# Patient Record
Sex: FEMALE | Race: WHITE | Employment: UNEMPLOYED | URBAN - METROPOLITAN AREA
[De-identification: names, ages, dates, MRNs, and addresses within clinical notes are randomized per-mention and may not be internally consistent; named-entity substitution may affect disease eponyms.]

---

## 2019-04-26 LAB — CREATININE, EXTERNAL: 0.6

## 2019-08-14 LAB — LDL-C, EXTERNAL: 107

## 2019-12-14 ENCOUNTER — HOSPITAL ENCOUNTER (EMERGENCY)
Age: 29
Discharge: HOME | End: 2019-12-14
Attending: EMERGENCY MEDICINE
Payer: OTHER GOVERNMENT

## 2019-12-14 ENCOUNTER — APPOINTMENT (OUTPATIENT)
Dept: GENERAL RADIOLOGY | Age: 29
End: 2019-12-14
Attending: EMERGENCY MEDICINE
Payer: OTHER GOVERNMENT

## 2019-12-14 VITALS
SYSTOLIC BLOOD PRESSURE: 117 MMHG | HEART RATE: 84 BPM | DIASTOLIC BLOOD PRESSURE: 74 MMHG | RESPIRATION RATE: 16 BRPM | OXYGEN SATURATION: 99 % | BODY MASS INDEX: 20.4 KG/M2 | TEMPERATURE: 98.7 F | HEIGHT: 67 IN | WEIGHT: 130 LBS

## 2019-12-14 DIAGNOSIS — S16.1XXA STRAIN OF NECK MUSCLE, INITIAL ENCOUNTER: Primary | ICD-10-CM

## 2019-12-14 PROCEDURE — 74011250637 HC RX REV CODE- 250/637: Performed by: EMERGENCY MEDICINE

## 2019-12-14 PROCEDURE — 99283 EMERGENCY DEPT VISIT LOW MDM: CPT

## 2019-12-14 PROCEDURE — 72040 X-RAY EXAM NECK SPINE 2-3 VW: CPT

## 2019-12-14 RX ORDER — IBUPROFEN 600 MG/1
600 TABLET ORAL
Qty: 20 TAB | Refills: 0 | Status: SHIPPED | OUTPATIENT
Start: 2019-12-14

## 2019-12-14 RX ORDER — IBUPROFEN 600 MG/1
600 TABLET ORAL
Status: COMPLETED | OUTPATIENT
Start: 2019-12-14 | End: 2019-12-14

## 2019-12-14 RX ADMIN — IBUPROFEN 600 MG: 600 TABLET, FILM COATED ORAL at 02:00

## 2019-12-14 NOTE — DISCHARGE INSTRUCTIONS
Patient Education        Neck Strain: Care Instructions  Your Care Instructions    You have strained the muscles and ligaments in your neck. A sudden, awkward movement can strain the neck. This often occurs with falls or car accidents or during certain sports. Everyday activities like working on a computer or sleeping can also cause neck strain if they force you to hold your neck in an awkward position for a long time. It is common for neck pain to get worse for a day or two after an injury, but it should start to feel better after that. You may have more pain and stiffness for several days before it gets better. This is expected. It may take a few weeks or longer for it to heal completely. Good home treatment can help you get better faster and avoid future neck problems. Follow-up care is a key part of your treatment and safety. Be sure to make and go to all appointments, and call your doctor if you are having problems. It's also a good idea to know your test results and keep a list of the medicines you take. How can you care for yourself at home? · If you were given a neck brace (cervical collar) to limit neck motion, wear it as instructed for as many days as your doctor tells you to. Do not wear it longer than you were told to. Wearing a brace for too long can make neck stiffness worse and weaken the neck muscles. · You can try using heat or ice to see if it helps. ? Try using a heating pad on a low or medium setting for 15 to 20 minutes every 2 to 3 hours. Try a warm shower in place of one session with the heating pad. You can also buy single-use heat wraps that last up to 8 hours. ? You can also try an ice pack for 10 to 15 minutes every 2 to 3 hours. · Take pain medicines exactly as directed. ? If the doctor gave you a prescription medicine for pain, take it as prescribed. ? If you are not taking a prescription pain medicine, ask your doctor if you can take an over-the-counter medicine.   · Gently rub the area to relieve pain and help with blood flow. Do not massage the area if it hurts to do so. · Do not do anything that makes the pain worse. Take it easy for a couple of days. You can do your usual activities if they do not hurt your neck or put it at risk for more stress or injury. · Try sleeping on a special neck pillow. Place it under your neck, not under your head. Placing a tightly rolled-up towel under your neck while you sleep will also work. If you use a neck pillow or rolled towel, do not use your regular pillow at the same time. · To prevent future neck pain, do exercises to stretch and strengthen your neck and back. Learn how to use good posture, safe lifting techniques, and proper body mechanics. When should you call for help? Call 911 anytime you think you may need emergency care. For example, call if:    · You are unable to move an arm or a leg at all.   [facility] your doctor now or seek immediate medical care if:    · You have new or worse symptoms in your arms, legs, chest, belly, or buttocks. Symptoms may include:  ? Numbness or tingling. ? Weakness. ? Pain.     · You lose bladder or bowel control.    Watch closely for changes in your health, and be sure to contact your doctor if:    · You are not getting better as expected. Where can you learn more? Go to http://richar-janie.info/. Enter M253 in the search box to learn more about \"Neck Strain: Care Instructions. \"  Current as of: June 26, 2019  Content Version: 12.2  © 0945-5548 [employer]. Care instructions adapted under license by [employer] (which disclaims liability or warranty for this information). If you have questions about a medical condition or this instruction, always ask your healthcare professional. [address] any warranty or liability for your use of this information.

## 2019-12-14 NOTE — ED PROVIDER NOTES
HPI patient is a 59-year-old female who complains of neck pain for 5 days. Pt c/o pain from top of head down to neck d/t jerking injury several days ago. History reviewed. No pertinent past medical history. History reviewed. No pertinent surgical history. History reviewed. No pertinent family history. Social History     Socioeconomic History    Marital status:      Spouse name: Not on file    Number of children: Not on file    Years of education: Not on file    Highest education level: Not on file   Occupational History    Not on file   Social Needs    Financial resource strain: Not on file    Food insecurity:     Worry: Not on file     Inability: Not on file    Transportation needs:     Medical: Not on file     Non-medical: Not on file   Tobacco Use    Smoking status: Current Every Day Smoker     Packs/day: 1.00    Smokeless tobacco: Never Used   Substance and Sexual Activity    Alcohol use: Not Currently    Drug use: Never    Sexual activity: Not on file   Lifestyle    Physical activity:     Days per week: Not on file     Minutes per session: Not on file    Stress: Not on file   Relationships    Social connections:     Talks on phone: Not on file     Gets together: Not on file     Attends Rastafarian service: Not on file     Active member of club or organization: Not on file     Attends meetings of clubs or organizations: Not on file     Relationship status: Not on file    Intimate partner violence:     Fear of current or ex partner: Not on file     Emotionally abused: Not on file     Physically abused: Not on file     Forced sexual activity: Not on file   Other Topics Concern    Not on file   Social History Narrative    Not on file         ALLERGIES: Patient has no known allergies. Review of Systems   Constitutional: Negative. HENT: Negative. Eyes: Negative. Respiratory: Negative. Cardiovascular: Negative. Gastrointestinal: Negative.     Endocrine: Negative. Genitourinary: Negative. Musculoskeletal: Negative. Skin: Negative. Allergic/Immunologic: Negative. Neurological: Negative. Hematological: Negative. Psychiatric/Behavioral: Negative. All other systems reviewed and are negative. Vitals:    12/14/19 0009   BP: 117/74   Pulse: 84   Resp: 16   Temp: 98.7 °F (37.1 °C)   SpO2: 99%   Weight: 59 kg (130 lb)   Height: 5' 7\" (1.702 m)            Physical Exam  Vitals signs and nursing note reviewed. Constitutional:       General: She is not in acute distress. Appearance: She is well-developed. She is not diaphoretic. HENT:      Head: Normocephalic. Right Ear: External ear normal.      Left Ear: External ear normal.      Mouth/Throat:      Pharynx: No oropharyngeal exudate. Eyes:      General: No scleral icterus. Right eye: No discharge. Left eye: No discharge. Conjunctiva/sclera: Conjunctivae normal.      Pupils: Pupils are equal, round, and reactive to light. Neck:      Musculoskeletal: Normal range of motion and neck supple. Muscular tenderness (mild neck tenderness) present. Thyroid: No thyromegaly. Vascular: No JVD. Trachea: No tracheal deviation. Cardiovascular:      Rate and Rhythm: Normal rate and regular rhythm. Heart sounds: Normal heart sounds. No murmur. No friction rub. No gallop. Pulmonary:      Effort: Pulmonary effort is normal. No respiratory distress. Breath sounds: Normal breath sounds. No stridor. No wheezing or rales. Chest:      Chest wall: No tenderness. Abdominal:      General: Bowel sounds are normal. There is no distension. Palpations: Abdomen is soft. There is no mass. Tenderness: There is no tenderness. There is no guarding or rebound. Musculoskeletal: Normal range of motion. General: No tenderness. Lymphadenopathy:      Cervical: No cervical adenopathy. Skin:     General: Skin is warm and dry.       Coloration: Skin is not pale. Findings: No erythema or rash. Neurological:      Mental Status: She is alert and oriented to person, place, and time. Cranial Nerves: No cranial nerve deficit. Motor: No abnormal muscle tone. Coordination: Coordination normal.      Deep Tendon Reflexes: Reflexes normal.          MDM  Number of Diagnoses or Management Options  Strain of neck muscle, initial encounter:        Dx: Disp: D/C  Home    Dictation disclaimer:  Please note that this dictation was completed with [employer], the computer voice recognition software. Quite often unanticipated grammatical, syntax, homophones, and other interpretive errors are inadvertently transcribed by the computer software. Please disregard these errors. Please excuse any errors that have escaped final proofreading.

## 2019-12-14 NOTE — ED NOTES
2:02 AM  12/14/19     Discharge instructions given to Ewa Abreu (name) with verbalization of understanding. Patient accompanied by self. Patient discharged with the following prescriptions motrin. Patient discharged to home (destination).       Wendy Aquino

## 2020-08-19 NOTE — PROGRESS NOTES
[facility]  [address], [address]  Phone: [phone]  Fax: [phone]        INITIAL CONSULTATION      HISTORY OF PRESENT ILLNESS:  Deng Salas is a 26 y.o. female whom is referred from Logan Alvarenga NP secondary to upper cervical spine pain radiating into the RUE to the elbow x 8 months following a fall. She rates her pain 2/10. Pt was holding a ladder and fell backwards. She reports her pain has improved since onset. She has treated with steroids, NSAIDs and muscle relaxants without benefit. She reports intolerance to NEURONTIN secondary to drowsiness. Pt denies dropping things or loss of balance. Pt completed PT with benefit. She is inconsistent with her HEP. Patient denies previous spinal surgery or injections. ER note from Dr. Angel Cesar dated 12/14/2019 indicating patient presented for neck pain from the top of head down to neck . Note from Logan Alvarenga NP dated 6/19/2020 indicating patient was seen with c/o neck pain radiating into her bilateral shoulders after a fall x 7 months ago which caused hyperflexion of her neck. Treated with PT and NSAIDs. Note from Logan Alvarenga NP dated 7/6/2020 indicating patient went to ER at [facility] for chest pain under the right breast. Pt had c/o right sided rib pain. She treated with Naprosyn with benefit. C spine XR dated 12/14/2019 films not independently reviewed. Per report, reversed cervical lordosis with trace anterolisthesis C3 upon 4. No vertebral body compression deformity. Moderate presumed degenerative disc space narrowing C4-5. C spine MRI dated 7/6/2020 films not available for my independent review. Per report, mild multilevel cervical spondylosis without significant spinal canal stenosis. Disc osteophyte complex and proliferative changes at the C4-5 level noted with moderate left-sided neural foraminal narrowing. No area of high-grade neural foraminal stenosis. The patient is RHD.  reviewed. Body mass index is 19.33 kg/m². PCP: UNKNOWN    History reviewed. No pertinent past medical history. History reviewed. No pertinent surgical history. Social History     Tobacco Use    Smoking status: Current Every Day Smoker     Packs/day: 1.00    Smokeless tobacco: Never Used   Substance Use Topics    Alcohol use: Not Currently       Work status: The patient is unemployed. Marital status: . Current Outpatient Medications   Medication Sig Dispense Refill    ibuprofen (MOTRIN) 600 mg tablet Take 1 Tab by mouth every six (6) hours as needed for Pain. 20 Tab 0       No Known Allergies         Family History   Problem Relation Age of Onset    Cancer Mother     Cancer Father          REVIEW OF SYSTEMS  Constitutional symptoms: Negative  Eyes: Negative  Ears, Nose, Throat, and Mouth: Negative  Cardiovascular: Negative  Respiratory: Negative  Genitourinary: Negative  Integumentary (Skin and/or breast): Negative  Musculoskeletal: Positive for neck pain. Extremities: Negative for edema. Endocrine/Rheumatologic: Negative  Hematologic/Lymphatic: Negative  Allergic/Immunologic: Negative  Psychiatric: Negative       PHYSICAL EXAMINATION  Visit Vitals  /65 (BP 1 Location: Left arm, BP Patient Position: Sitting)   Pulse 76   Temp 97.8 °F (36.6 °C) (Temporal)   Ht 5' 7\" (1.702 m)   Wt 123 lb 6.4 oz (56 kg)   SpO2 98%   BMI 19.33 kg/m²       CONSTITUTIONAL: NAD, A&O x 3  HEART: Regular rate and rhythm  GASTROINTESTINAL: Positive bowel sounds, soft, nontender, and nondistended  LUNGS: Clear to auscultation bilaterally. SKIN: Negative for rash. RANGE OF MOTION: The patient has full passive range of motion in all four extremities. SENSATION: Sensation is intact to light touch throughout. MOTOR:   Straight Leg Raise: Negative, bilateral  [place]: Negative, bilateral  Tandem Gait: Neg.    Deep tendon reflexes are 0 at the biceps, triceps, and brachioradialis bilaterally. Deep tendon reflexes are 2 at the knees and ankles bilaterally. Shoulder AB/Flex Elbow Flex Wrist Ext Elbow Ext Wrist Flex Hand Intrin Tone   Right +4/5 +4/5 +4/5 +4/5 +4/5 +4/5 +4/5   Left +4/5 +4/5 +4/5 +4/5 +4/5 +4/5 +4/5              Hip Flex Knee Ext Knee Flex Ankle DF GTE Ankle PF Tone   Right +4/5 +4/5 +4/5 +4/5 +4/5 +4/5 +4/5   Left +4/5 +4/5 +4/5 +4/5 +4/5 +4/5 +4/5       ASSESSMENT   Diagnoses and all orders for this visit:    1. HNP (herniated nucleus pulposus) with myelopathy, cervical    2. Cervical spondylosis without myelopathy    3. Cervical neuritis       IMPRESSIONS/RECOMMENDATIONS:  Patient presents today with c/o neck pain. Multiple treatment options were discussed. I discussed with the pt a referral to a chiropractor. However, I told her I would like the opportunity to review her MRI films prior to the order to ensure there are no contraindications. There are no contraindications from her MRI report. She was agreeable to bring her films to my office for my review. I recommended she increase the frequency of HEP to daily. Patient is neurologically intact. I will see the patient back in 6 week's time or earlier if needed. Written by Shikha Maharaj, as dictated by Dempsey Sever, MD  I examined the patient, reviewed and agree with the note.

## 2020-08-20 ENCOUNTER — OFFICE VISIT (OUTPATIENT)
Dept: ORTHOPEDIC SURGERY | Age: 30
End: 2020-08-20

## 2020-08-20 VITALS
BODY MASS INDEX: 19.37 KG/M2 | HEART RATE: 76 BPM | HEIGHT: 67 IN | WEIGHT: 123.4 LBS | OXYGEN SATURATION: 98 % | TEMPERATURE: 97.8 F | SYSTOLIC BLOOD PRESSURE: 107 MMHG | DIASTOLIC BLOOD PRESSURE: 65 MMHG

## 2020-08-20 DIAGNOSIS — M47.812 CERVICAL SPONDYLOSIS WITHOUT MYELOPATHY: ICD-10-CM

## 2020-08-20 DIAGNOSIS — M54.12 CERVICAL NEURITIS: ICD-10-CM

## 2020-08-20 DIAGNOSIS — M50.00 HNP (HERNIATED NUCLEUS PULPOSUS) WITH MYELOPATHY, CERVICAL: Primary | ICD-10-CM

## 2020-08-28 ENCOUNTER — TELEPHONE (OUTPATIENT)
Dept: ORTHOPEDIC SURGERY | Age: 30
End: 2020-08-28

## 2020-08-28 DIAGNOSIS — M54.12 CERVICAL NEURITIS: Primary | ICD-10-CM

## 2020-08-28 DIAGNOSIS — M47.812 CERVICAL SPONDYLOSIS WITHOUT MYELOPATHY: ICD-10-CM

## 2020-08-28 DIAGNOSIS — M50.00 HNP (HERNIATED NUCLEUS PULPOSUS) WITH MYELOPATHY, CERVICAL: ICD-10-CM

## 2020-08-28 NOTE — TELEPHONE ENCOUNTER
Patient called for Cody Funes. Patient would like to know if Cody Funes had a chance to look at the Mri she dropped off at the [facility] location this past Monday 8/24/20 or Tuesday 8/25/20. Patient would like to know what Cody Funes next options will be for her Neck. Patient tel. [phone]. Note : patient was seen on 8/20/20 for her neck and back by Cody Funes.

## 2020-09-01 NOTE — TELEPHONE ENCOUNTER
Patient is aware that we do have her disk and I will have Dr. Jeanmarie Lancaster review it tomorrow.

## 2020-09-09 NOTE — TELEPHONE ENCOUNTER
Spoke with patient on 9.4.2020 and informed her that per Dr. Guyann Ahumada she is ok to continue with a chiropractor. Patient would like to know which one Dr. Guyann Ahumada would perfer she go to. I will discuss this with his today.

## 2020-09-25 ENCOUNTER — TELEPHONE (OUTPATIENT)
Dept: ORTHOPEDIC SURGERY | Age: 30
End: 2020-09-25

## 2020-09-25 DIAGNOSIS — M50.00 HNP (HERNIATED NUCLEUS PULPOSUS) WITH MYELOPATHY, CERVICAL: ICD-10-CM

## 2020-09-25 DIAGNOSIS — M54.12 CERVICAL NEURITIS: Primary | ICD-10-CM

## 2020-09-25 DIAGNOSIS — M47.812 CERVICAL SPONDYLOSIS WITHOUT MYELOPATHY: ICD-10-CM

## 2020-09-25 NOTE — TELEPHONE ENCOUNTER
Contacted patient, reached unidentified voice, left generic message. Attempted to contact patient in regards to the Chiropractic referral. This is a non-covered service according to [employer] and patient would have to pay out of pocket. I can still forward the referral or we could order physical therapy which is a covered service. Which would Ms. Kelvin Fung prefer?

## 2020-09-29 NOTE — TELEPHONE ENCOUNTER
Patient called in twice. I returned her call but connected with unidentified voice mail and left another generic message.

## 2020-09-29 NOTE — TELEPHONE ENCOUNTER
Mr. Danny Aguilera wasn't aware her  [employer] did not cover chiropractics. After some discussion, she would like to pursue physical therapy instead. Please enter a physical therapy order so I may obtain authorization for her. Thank you.

## 2020-10-01 NOTE — TELEPHONE ENCOUNTER
[facility] authorization has been obtained and forwarded for scheduling. See referral for facility information. Thank you.

## 2020-11-19 VITALS — BODY MASS INDEX: 19.77 KG/M2 | HEIGHT: 68 IN

## 2020-11-19 PROBLEM — M54.12 CERVICAL RADICULOPATHY: Status: ACTIVE | Noted: 2020-11-19

## 2021-02-08 NOTE — PROGRESS NOTES
[facility]  [address], [address]  Phone: [phone]  Fax: [phone]        PROGRESS NOTE      HISTORY OF PRESENT ILLNESS:  The patient is a 26 y.o. female and was seen today for follow up of upper cervical spine pain radiating into the RUE to the elbow x 1/2020 following a fall. Pt was holding a ladder and fell backwards. She reports her pain has improved since onset. She has treated with steroids, NSAIDs and muscle relaxants without benefit. She reports intolerance to NEURONTIN secondary to drowsiness. Pt denies dropping things or loss of balance. Pt completed PT with benefit. She is inconsistent with her HEP. Patient denies previous spinal surgery or injections. The patient is RHD. ER note from Dr. Asim Vaca dated 12/14/2019 indicating patient presented for neck pain from the top of head down to neck . Note from Cecilia Garcia NP dated 6/19/2020 indicating patient was seen with c/o neck pain radiating into her bilateral shoulders after a fall x 7 months ago which caused hyperflexion of her neck. Treated with PT and NSAIDs. Note from Cecilia Garcia NP dated 7/6/2020 indicating patient went to ER at [facility] for chest pain under the right breast. Pt had c/o right sided rib pain. She treated with Naprosyn with benefit. C spine XR dated 12/14/2019 films not independently reviewed. Per report, reversed cervical lordosis with trace anterolisthesis C3 upon 4. No vertebral body compression deformity. Moderate presumed degenerative disc space narrowing C4-5. C spine MRI dated 7/6/2020 films not available for my independent review. Per report, mild multilevel cervical spondylosis without significant spinal canal stenosis. Disc osteophyte complex and proliferative changes at the C4-5 level noted with moderate left-sided neural foraminal narrowing. No area of high-grade neural foraminal stenosis.  At her last clinical appointment, I discussed with the pt a referral to a chiropractor. However, I told her I would like the opportunity to review her MRI films prior to the order to ensure there were no contraindications. There were no contraindications from her MRI report. She was agreeable to bring her films to my office for my review. I recommended she increase the frequency of HEP to daily. The patient returns today with neck pain radiating into the LUE to the hand involving digits 4 and 5. She rates her pain 3-10/10, previously 2/10. She reports occasional radicular pain from her cervical spine extending into the buttocks. Pt failed to f/u in 6 week's time due to improvement in her pain. She had an increase in pain 2 months ago without trauma. She is compliant with her HEP. Pt denies dropping things or loss of balance.  reviewed. Body mass index is 19.73 kg/m².     PCP: UNKNOWN      Past Medical History:   Diagnosis Date    Dizziness     Ear problems     Pneumonia         Social History     Socioeconomic History    Marital status:      Spouse name: Not on file    Number of children: Not on file    Years of education: Not on file    Highest education level: Not on file   Occupational History    Not on file   Social Needs    Financial resource strain: Not on file    Food insecurity     Worry: Not on file     Inability: Not on file    Transportation needs     Medical: Not on file     Non-medical: Not on file   Tobacco Use    Smoking status: Current Every Day Smoker     Packs/day: 1.00    Smokeless tobacco: Never Used   Substance and Sexual Activity    Alcohol use: Not Currently    Drug use: Never    Sexual activity: Not on file   Lifestyle    Physical activity     Days per week: Not on file     Minutes per session: Not on file    Stress: Not on file   Relationships    Social connections     Talks on phone: Not on file     Gets together: Not on file     Attends Congregational service: Not on file     Active member of club or organization: Not on file     Attends meetings of clubs or organizations: Not on file     Relationship status: Not on file    Intimate partner violence     Fear of current or ex partner: Not on file     Emotionally abused: Not on file     Physically abused: Not on file     Forced sexual activity: Not on file   Other Topics Concern    Not on file   Social History Narrative    Not on file       Current Outpatient Medications   Medication Sig Dispense Refill    methylPREDNISolone (MEDROL DOSEPACK) 4 mg tablet Per dose pack instructions 1 Dose Pack 0    naproxen (NAPROSYN) 500 mg tablet Take 1 Tab by mouth two (2) times daily (with meals). 45 Tab 0    cyclobenzaprine (FLEXERIL) 10 mg tablet Take 1 Tab by mouth three (3) times daily as needed for Muscle Spasm(s). 40 Tab 0    ibuprofen (MOTRIN) 600 mg tablet Take 1 Tab by mouth every six (6) hours as needed for Pain. 20 Tab 0       No Known Allergies       PHYSICAL EXAMINATION    Visit Vitals  /83 (BP 1 Location: Left upper arm)   Pulse 90   Temp 97 °F (36.1 °C)   Resp 18   Ht 5' 7\" (1.702 m)   Wt 126 lb (57.2 kg)   SpO2 100%   BMI 19.73 kg/m²       CONSTITUTIONAL: NAD, A&O x 3  SENSATION: Intact to light touch throughout  NEURO: Brijesh's is negative bilaterally. RANGE OF MOTION: The patient has full passive range of motion in all four extremities. MOTOR:  Straight Leg Raise: Negative, bilateral       Shoulder AB/Flex Elbow Flex Wrist Ext Elbow Ext Wrist Flex Hand Intrin Tone   Right +4/5 +4/5 +4/5 +4/5 +4/5 +4/5 +4/5   Left +4/5 +4/5 +4/5 +4/5 +4/5 +4/5 +4/5              Hip Flex Knee Ext Knee Flex Ankle DF GTE Ankle PF Tone   Right +4/5 +4/5 +4/5 +4/5 +4/5 +4/5 +4/5   Left +4/5 +4/5 +4/5 +4/5 +4/5 +4/5 +4/5     RADIOGRAPHS  Cervical spine plain films dated 2/10/2021. 2 views AP and Lateral. Revealed:  Nonspecific cervical spine straightening. Mild disc space narrowing at C4-5.      Lumbar spine plain films dated 2/10/2021. 2 views AP and Lateral. Revealed:  Mild dextrorotatory scoliosis. [place] L3 convex to the left. Mild disc space narrowing at L3-4, L4-5, and L5-S1. Slight retrolisthesis of L2 on L3. No acute pathology identified. ASSESSMENT   Diagnoses and all orders for this visit:    1. Neck pain  -     AMB POC XRAY, SPINE, CERVICAL; 2 OR 3  -     methylPREDNISolone (MEDROL DOSEPACK) 4 mg tablet; Per dose pack instructions  -     naproxen (NAPROSYN) 500 mg tablet; Take 1 Tab by mouth two (2) times daily (with meals). -     cyclobenzaprine (FLEXERIL) 10 mg tablet; Take 1 Tab by mouth three (3) times daily as needed for Muscle Spasm(s). 2. Lumbar pain  -     AMB POC XRAY, SPINE, LUMBOSACRAL; 2 O  -     methylPREDNISolone (MEDROL DOSEPACK) 4 mg tablet; Per dose pack instructions  -     naproxen (NAPROSYN) 500 mg tablet; Take 1 Tab by mouth two (2) times daily (with meals). -     cyclobenzaprine (FLEXERIL) 10 mg tablet; Take 1 Tab by mouth three (3) times daily as needed for Muscle Spasm(s). 3. Cervical neuritis  -     methylPREDNISolone (MEDROL DOSEPACK) 4 mg tablet; Per dose pack instructions  -     naproxen (NAPROSYN) 500 mg tablet; Take 1 Tab by mouth two (2) times daily (with meals). -     cyclobenzaprine (FLEXERIL) 10 mg tablet; Take 1 Tab by mouth three (3) times daily as needed for Muscle Spasm(s). 4. Cervical spondylosis without myelopathy  -     methylPREDNISolone (MEDROL DOSEPACK) 4 mg tablet; Per dose pack instructions  -     naproxen (NAPROSYN) 500 mg tablet; Take 1 Tab by mouth two (2) times daily (with meals). -     cyclobenzaprine (FLEXERIL) 10 mg tablet; Take 1 Tab by mouth three (3) times daily as needed for Muscle Spasm(s). 5. HNP (herniated nucleus pulposus) with myelopathy, cervical  -     methylPREDNISolone (MEDROL DOSEPACK) 4 mg tablet; Per dose pack instructions  -     naproxen (NAPROSYN) 500 mg tablet; Take 1 Tab by mouth two (2) times daily (with meals). -     cyclobenzaprine (FLEXERIL) 10 mg tablet;  Take 1 Tab by mouth three (3) times daily as needed for Muscle Spasm(s). 6. Strain of neck muscle, initial encounter  -     methylPREDNISolone (MEDROL DOSEPACK) 4 mg tablet; Per dose pack instructions  -     naproxen (NAPROSYN) 500 mg tablet; Take 1 Tab by mouth two (2) times daily (with meals). -     cyclobenzaprine (FLEXERIL) 10 mg tablet; Take 1 Tab by mouth three (3) times daily as needed for Muscle Spasm(s). 7. Thoracic myofascial strain, initial encounter  -     methylPREDNISolone (MEDROL DOSEPACK) 4 mg tablet; Per dose pack instructions  -     naproxen (NAPROSYN) 500 mg tablet; Take 1 Tab by mouth two (2) times daily (with meals). -     cyclobenzaprine (FLEXERIL) 10 mg tablet; Take 1 Tab by mouth three (3) times daily as needed for Muscle Spasm(s). 8. Strain of lumbar region, initial encounter  -     methylPREDNISolone (MEDROL DOSEPACK) 4 mg tablet; Per dose pack instructions  -     naproxen (NAPROSYN) 500 mg tablet; Take 1 Tab by mouth two (2) times daily (with meals). -     cyclobenzaprine (FLEXERIL) 10 mg tablet; Take 1 Tab by mouth three (3) times daily as needed for Muscle Spasm(s). 9. DDD (degenerative disc disease), lumbar  -     methylPREDNISolone (MEDROL DOSEPACK) 4 mg tablet; Per dose pack instructions  -     naproxen (NAPROSYN) 500 mg tablet; Take 1 Tab by mouth two (2) times daily (with meals). -     cyclobenzaprine (FLEXERIL) 10 mg tablet; Take 1 Tab by mouth three (3) times daily as needed for Muscle Spasm(s). IMPRESSION AND PLAN:  Patient returns to the office today with c/o neck pain radiating into the LUE to the hand involving digits 4 and 5. Multiple treatment options were discussed. I will start her on Medrol Dosepak followed by Naprosyn 500 mg BID. I prescribed her Flexeril. I encouraged her to continue to perform her daily HEP. Patient is neurologically intact. I will see the patient back in 1 month's time or earlier if needed.       Written by Ayo Jha, as dictated by Dwight Holland MD  I examined the patient, reviewed and agree with the note.

## 2021-02-10 ENCOUNTER — OFFICE VISIT (OUTPATIENT)
Dept: ORTHOPEDIC SURGERY | Age: 31
End: 2021-02-10
Payer: OTHER GOVERNMENT

## 2021-02-10 VITALS
HEIGHT: 67 IN | WEIGHT: 126 LBS | TEMPERATURE: 97 F | OXYGEN SATURATION: 100 % | HEART RATE: 90 BPM | DIASTOLIC BLOOD PRESSURE: 83 MMHG | SYSTOLIC BLOOD PRESSURE: 125 MMHG | RESPIRATION RATE: 18 BRPM | BODY MASS INDEX: 19.78 KG/M2

## 2021-02-10 DIAGNOSIS — S16.1XXA STRAIN OF NECK MUSCLE, INITIAL ENCOUNTER: ICD-10-CM

## 2021-02-10 DIAGNOSIS — M54.50 LUMBAR PAIN: ICD-10-CM

## 2021-02-10 DIAGNOSIS — S29.019A THORACIC MYOFASCIAL STRAIN, INITIAL ENCOUNTER: ICD-10-CM

## 2021-02-10 DIAGNOSIS — M50.00 HNP (HERNIATED NUCLEUS PULPOSUS) WITH MYELOPATHY, CERVICAL: ICD-10-CM

## 2021-02-10 DIAGNOSIS — M47.812 CERVICAL SPONDYLOSIS WITHOUT MYELOPATHY: ICD-10-CM

## 2021-02-10 DIAGNOSIS — M54.2 NECK PAIN: Primary | ICD-10-CM

## 2021-02-10 DIAGNOSIS — M51.36 DDD (DEGENERATIVE DISC DISEASE), LUMBAR: ICD-10-CM

## 2021-02-10 DIAGNOSIS — M54.12 CERVICAL NEURITIS: ICD-10-CM

## 2021-02-10 DIAGNOSIS — S39.012A STRAIN OF LUMBAR REGION, INITIAL ENCOUNTER: ICD-10-CM

## 2021-02-10 PROCEDURE — 72100 X-RAY EXAM L-S SPINE 2/3 VWS: CPT | Performed by: PHYSICAL MEDICINE & REHABILITATION

## 2021-02-10 PROCEDURE — 99214 OFFICE O/P EST MOD 30 MIN: CPT | Performed by: PHYSICAL MEDICINE & REHABILITATION

## 2021-02-10 PROCEDURE — 72040 X-RAY EXAM NECK SPINE 2-3 VW: CPT | Performed by: PHYSICAL MEDICINE & REHABILITATION

## 2021-02-10 RX ORDER — CYCLOBENZAPRINE HCL 10 MG
10 TABLET ORAL
Qty: 40 TAB | Refills: 0 | Status: SHIPPED | OUTPATIENT
Start: 2021-02-10

## 2021-02-10 RX ORDER — NAPROXEN 500 MG/1
500 TABLET ORAL 2 TIMES DAILY WITH MEALS
Qty: 45 TAB | Refills: 0 | Status: SHIPPED | OUTPATIENT
Start: 2021-02-10

## 2021-02-10 RX ORDER — METHYLPREDNISOLONE 4 MG/1
TABLET ORAL
Qty: 1 DOSE PACK | Refills: 0 | Status: SHIPPED | OUTPATIENT
Start: 2021-02-10 | End: 2021-09-27 | Stop reason: ALTCHOICE

## 2021-02-10 NOTE — LETTER
2/10/2021 Patient: Oscar Alanis YOB: 1990 Date of Visit: 2/10/2021 Gregoria Poole NP 
[id number] [facility] [place] [address] [address] [employer] Dear Gregoria Poole NP, Thank you for referring Ms. Oscar Alanis to [facility] [address] for evaluation. My notes for this consultation are attached. If you have questions, please do not hesitate to call me. I look forward to following your patient along with you. Sincerely, Haroon Escobar MD

## 2021-06-10 ENCOUNTER — TRANSCRIBE ORDER (OUTPATIENT)
Dept: SCHEDULING | Age: 31
End: 2021-06-10

## 2021-06-10 DIAGNOSIS — N63.20 MASS OF LEFT BREAST: Primary | ICD-10-CM

## 2021-07-07 ENCOUNTER — TRANSCRIBE ORDER (OUTPATIENT)
Dept: SCHEDULING | Age: 31
End: 2021-07-07

## 2021-07-07 DIAGNOSIS — Z80.3 FAMILY HISTORY OF MALIGNANT NEOPLASM OF BREAST: ICD-10-CM

## 2021-07-07 DIAGNOSIS — N63.20 MASS OF LEFT BREAST: Primary | ICD-10-CM

## 2021-07-23 ENCOUNTER — DOCUMENTATION ONLY (OUTPATIENT)
Dept: ORTHOPEDIC SURGERY | Age: 31
End: 2021-07-23

## 2021-09-07 ENCOUNTER — HOSPITAL ENCOUNTER (OUTPATIENT)
Dept: ULTRASOUND IMAGING | Age: 31
Discharge: HOME | End: 2021-09-07
Attending: NURSE PRACTITIONER
Payer: OTHER GOVERNMENT

## 2021-09-07 ENCOUNTER — HOSPITAL ENCOUNTER (OUTPATIENT)
Dept: MAMMOGRAPHY | Age: 31
Discharge: HOME | End: 2021-09-07
Attending: NURSE PRACTITIONER
Payer: OTHER GOVERNMENT

## 2021-09-07 DIAGNOSIS — Z80.3 FAMILY HISTORY OF MALIGNANT NEOPLASM OF BREAST: ICD-10-CM

## 2021-09-07 DIAGNOSIS — N63.20 MASS OF LEFT BREAST: ICD-10-CM

## 2021-09-07 PROCEDURE — 77062 BREAST TOMOSYNTHESIS BI: CPT

## 2021-09-07 PROCEDURE — 76642 ULTRASOUND BREAST LIMITED: CPT

## 2021-09-24 NOTE — PROGRESS NOTES
[facility]  [address]  [address], [address]   Phone: [phone]  Fax: [phone]        PROGRESS NOTE      HISTORY OF PRESENT ILLNESS:  The patient is a 33 y.o. female and was seen today for follow up of neck pain radiating into the LUE to the hand involving digits 4 and 5. Previously seen for upper cervical spine pain radiating into the RUE to the elbow x 1/2020 following a fall. Pt was holding a ladder and fell backwards. She reports her pain has improved since onset. She has treated with steroids, NSAIDs and muscle relaxants without benefit. She reports intolerance to NEURONTIN secondary to drowsiness. Pt denies dropping things or loss of balance. Pt completed PT with benefit. She is inconsistent with her HEP. Patient denies previous spinal surgery or injections. The patient is RHD. ER note from Dr. Efe Keen 12/14/2019 indicating patient presented for neck pain from the top of head down to neck . Note from Cindi Fitch NP dated 6/19/2020 indicating patient was seen with c/o neck pain radiating into her bilateral shoulders after a fall x 7 months ago which caused hyperflexion of her neck. Treated with PT and NSAIDs. Note Guillermina Spears NP dated 7/6/2020 indicating patient went [facility] ER at [facility] for chest pain under the right breast. Pt had c/o right sided rib pain. She treated with Naprosyn with benefit. C spine MRI dated 7/6/2020 films not available for my independent review. Per report, mild multilevel cervical spondylosis without significant spinal canal stenosis. Disc osteophyte complex and proliferative changes at the C4-5 level noted with moderate left-sided neural foraminal narrowing. No area of high-grade neural foraminal stenosis. Cervical spine plain films dated 2/10/2021. 2 views AP and Lateral. Revealed:  Nonspecific cervical spine straightening. Mild disc space narrowing at C4-5.  Lumbar spine plain films dated 2/10/2021. 2 views AP and Lateral. Revealed:  Mild dextrorotatory scoliosis. [place] L3 convex to the left. Mild disc space narrowing at L3-4, L4-5, and L5-S1. Slight retrolisthesis of L2 on L3. No acute pathology identified. At her last clinical appointment, I started her on Medrol Dosepak followed by Naprosyn 500 mg BID. I prescribed her Flexeril. I encouraged her to continue to perform her daily HEP.         The patient returns today with buttocks pain and LLE pain radiating into the foot in S1 distribution. She rates her pain 4/10, previously 3-10/10. Her pain is exacerbated with sitting. Her pain is alleviated with leaning to the right while sitting. Pt was last seen by me on 2/10/2021, failed to f/u within 1 month's time. She completed the MDP with some relief. She reports she reinjured her tail bone 2 months prior after plopping down into a wooden chair. She states there was an acute onset of coccyx pain radiating into the buttocks and LLE in a S1 distribution to the foot. Pt admits to previous PT 5 years prior. Patient denies previous spinal surgery or injections. She denies treating with any additional medications. Pt admits to previous h/o stomach ulcers 10 years prior and denies being actively followed by a gastroenterologist. She reports she had a previous L spine MRI through [facility] 5 years prior. Pt denies change in bowel or bladder habits.  reviewed. Body mass index is 19.61 kg/m².     PCP: Quinton Jefferson NP      Past Medical History:   Diagnosis Date    Dizziness     Ear problems     Pneumonia         Social History     Socioeconomic History    Marital status:      Spouse name: Not on file    Number of children: Not on file    Years of education: Not on file    Highest education level: Not on file   Occupational History    Not on file   Tobacco Use    Smoking status: Current Every Day Smoker     Packs/day: 1.00    Smokeless tobacco: Never Used   Substance and Sexual Activity    Alcohol use: Not Currently    Drug use: Never    Sexual activity: Not on file   Other Topics Concern    Not on file   Social History Narrative    Not on file     Social Determinants of Health     Financial Resource Strain:     Difficulty of Paying Living Expenses:    Food Insecurity:     Worried About Running Out of Food in the Last Year:     [address] in the Last Year:    Transportation Needs:     Lack of Transportation (Medical):  Lack of Transportation (Non-Medical):    Physical Activity:     Days of Exercise per Week:     Minutes of Exercise per Session:    Stress:     Feeling of Stress :    Social Connections:     Frequency of Communication with Friends and Family:     Frequency of Social Gatherings with Friends and Family:     Attends Anabaptist Services:     Active Member of Clubs or Organizations:     Attends Club or Organization Meetings:     Marital Status:    Intimate Partner Violence:     Fear of Current or Ex-Partner:     Emotionally Abused:     Physically Abused:     Sexually Abused:        Current Outpatient Medications   Medication Sig Dispense Refill    ergocalciferol (ERGOCALCIFEROL) 1,250 mcg (50,000 unit) capsule TAKE 1 CAPSULE BY MOUTH ONCE WEEKLY FOR 12 WEEKS      cetirizine (ZYRTEC) 10 mg tablet Take 10 mg by mouth daily.  cyclobenzaprine (FLEXERIL) 10 mg tablet Take 1 Tab by mouth three (3) times daily as needed for Muscle Spasm(s). 40 Tab 0    ibuprofen (MOTRIN) 600 mg tablet Take 1 Tab by mouth every six (6) hours as needed for Pain. 20 Tab 0    naproxen (NAPROSYN) 500 mg tablet Take 1 Tab by mouth two (2) times daily (with meals).  45 Tab 0       No Known Allergies       PHYSICAL EXAMINATION    Visit Vitals  BP (!) 98/59 (BP 1 Location: Left upper arm, BP Patient Position: Sitting, BP Cuff Size: Small adult)   Pulse 68   Temp (!) 96.7 °F (35.9 °C) (Skin)   Ht 5' 7\" (1.702 m)   Wt 125 lb 3.2 oz (56.8 kg)   SpO2 100%   BMI 19.61 kg/m² CONSTITUTIONAL: NAD, A&O x 3  SENSATION: Intact to light touch throughout  RANGE OF MOTION: The patient has full passive range of motion in all four extremities. MOTOR:  Straight Leg Raise: Negative, bilateral               Hip Flex Knee Ext Knee Flex Ankle DF GTE Ankle PF Tone   Right +4/5 +4/5 +4/5 +4/5 +4/5 +4/5 +4/5   Left +4/5 +4/5 +4/5 +4/5 +4/5 +4/5 +4/5       RADIOGRAPHS  L spine plain films dated 9/27/2021. 2 views: AP and lateral. Revealed: Mild dextrorotary scoliosis. Mild disc space narrowing at L1-2, L2-3, L3-4, L4-5. No acute pathology identified. Sacrum and coccyx plain films dated 9/27/2021. 3 views: AP, lateral, axial. Revealed: No acute pathology identified. ASSESSMENT   Diagnoses and all orders for this visit:    1. Neck pain    2. Cervical neuritis    3. Lumbar pain    4. Cervical spondylosis without myelopathy    5. HNP (herniated nucleus pulposus) with myelopathy, cervical    6. DDD (degenerative disc disease), lumbar      IMPRESSION AND PLAN:  Patient returns to the office today with c/o buttocks pain and LLE pain radiating into the foot in S1 distribution. Multiple treatment options were discussed. I offered starting her on a neuropathic medication or blocks, pt declined. She spoke with another physician with regards to fat injections, I explained to the pt that I do not provide pt's with these types of injections. Patient is neurologically intact. I will see the patient back prn. Written by Ciro Paul, as dictated by Lima Chappell MD  I examined the patient, reviewed and agree with the note.

## 2021-09-27 ENCOUNTER — OFFICE VISIT (OUTPATIENT)
Dept: ORTHOPEDIC SURGERY | Age: 31
End: 2021-09-27
Payer: OTHER GOVERNMENT

## 2021-09-27 VITALS
TEMPERATURE: 96.7 F | DIASTOLIC BLOOD PRESSURE: 59 MMHG | WEIGHT: 125.2 LBS | HEART RATE: 68 BPM | SYSTOLIC BLOOD PRESSURE: 98 MMHG | BODY MASS INDEX: 19.65 KG/M2 | HEIGHT: 67 IN | OXYGEN SATURATION: 100 %

## 2021-09-27 DIAGNOSIS — M54.50 LUMBAR PAIN: ICD-10-CM

## 2021-09-27 DIAGNOSIS — M54.2 NECK PAIN: Primary | ICD-10-CM

## 2021-09-27 DIAGNOSIS — M47.812 CERVICAL SPONDYLOSIS WITHOUT MYELOPATHY: ICD-10-CM

## 2021-09-27 DIAGNOSIS — M50.00 HNP (HERNIATED NUCLEUS PULPOSUS) WITH MYELOPATHY, CERVICAL: ICD-10-CM

## 2021-09-27 DIAGNOSIS — M54.12 CERVICAL NEURITIS: ICD-10-CM

## 2021-09-27 DIAGNOSIS — M53.3 COCCYX PAIN: ICD-10-CM

## 2021-09-27 DIAGNOSIS — M41.9 SCOLIOSIS, UNSPECIFIED SCOLIOSIS TYPE, UNSPECIFIED SPINAL REGION: ICD-10-CM

## 2021-09-27 DIAGNOSIS — M51.36 DDD (DEGENERATIVE DISC DISEASE), LUMBAR: ICD-10-CM

## 2021-09-27 PROCEDURE — 72220 X-RAY EXAM SACRUM TAILBONE: CPT | Performed by: PHYSICAL MEDICINE & REHABILITATION

## 2021-09-27 PROCEDURE — 99213 OFFICE O/P EST LOW 20 MIN: CPT | Performed by: PHYSICAL MEDICINE & REHABILITATION

## 2021-09-27 PROCEDURE — 72100 X-RAY EXAM L-S SPINE 2/3 VWS: CPT | Performed by: PHYSICAL MEDICINE & REHABILITATION

## 2021-09-27 RX ORDER — CETIRIZINE HCL 10 MG
10 TABLET ORAL DAILY
COMMUNITY
Start: 2021-06-23

## 2021-09-27 RX ORDER — ERGOCALCIFEROL 1.25 MG/1
CAPSULE ORAL
COMMUNITY
Start: 2021-06-23

## 2021-09-27 NOTE — LETTER
9/27/2021    Patient: Claribel Bowser   YOB: 1990   Date of Visit: 9/27/2021     Sheree Torres NP  [address] [address]  Via Fax: [phone]    Dear Sheree Torres NP,      Thank you for referring Ms. Claribel Bowser to [facility] for evaluation. My notes for this consultation are attached. If you have questions, please do not hesitate to call me. I look forward to following your patient along with you.       Sincerely,    Ericka Matthews MD

## 2021-10-13 ENCOUNTER — TELEPHONE (OUTPATIENT)
Dept: ORTHOPEDIC SURGERY | Age: 31
End: 2021-10-13

## 2021-10-13 DIAGNOSIS — M54.50 LUMBAR PAIN: Primary | ICD-10-CM

## 2022-01-06 ENCOUNTER — TRANSCRIBE ORDER (OUTPATIENT)
Dept: SCHEDULING | Age: 32
End: 2022-01-06

## 2022-03-19 PROBLEM — M54.12 CERVICAL RADICULOPATHY: Status: ACTIVE | Noted: 2020-11-19

## 2023-06-09 ENCOUNTER — APPOINTMENT (OUTPATIENT)
Facility: HOSPITAL | Age: 33
End: 2023-06-09
Payer: OTHER GOVERNMENT

## 2023-06-09 ENCOUNTER — HOSPITAL ENCOUNTER (EMERGENCY)
Facility: HOSPITAL | Age: 33
Discharge: HOME | End: 2023-06-10
Attending: EMERGENCY MEDICINE
Payer: OTHER GOVERNMENT

## 2023-06-09 VITALS
OXYGEN SATURATION: 98 % | BODY MASS INDEX: 20.46 KG/M2 | DIASTOLIC BLOOD PRESSURE: 75 MMHG | HEIGHT: 68 IN | SYSTOLIC BLOOD PRESSURE: 115 MMHG | HEART RATE: 71 BPM | WEIGHT: 135 LBS | TEMPERATURE: 98.3 F | RESPIRATION RATE: 16 BRPM

## 2023-06-09 DIAGNOSIS — M25.572 ACUTE LEFT ANKLE PAIN: Primary | ICD-10-CM

## 2023-06-09 DIAGNOSIS — M79.672 LEFT FOOT PAIN: ICD-10-CM

## 2023-06-09 PROCEDURE — 6370000000 HC RX 637 (ALT 250 FOR IP): Performed by: EMERGENCY MEDICINE

## 2023-06-09 PROCEDURE — 73610 X-RAY EXAM OF ANKLE: CPT

## 2023-06-09 PROCEDURE — 73630 X-RAY EXAM OF FOOT: CPT

## 2023-06-09 RX ORDER — IBUPROFEN 600 MG/1
600 TABLET ORAL
Status: COMPLETED | OUTPATIENT
Start: 2023-06-10 | End: 2023-06-09

## 2023-06-09 RX ADMIN — IBUPROFEN 600 MG: 600 TABLET, FILM COATED ORAL at 23:42

## 2023-06-09 ASSESSMENT — PAIN DESCRIPTION - ORIENTATION
ORIENTATION: LEFT
ORIENTATION: LEFT

## 2023-06-09 ASSESSMENT — PAIN - FUNCTIONAL ASSESSMENT: PAIN_FUNCTIONAL_ASSESSMENT: 0-10

## 2023-06-09 ASSESSMENT — PAIN SCALES - GENERAL
PAINLEVEL_OUTOF10: 5
PAINLEVEL_OUTOF10: 5

## 2023-06-09 ASSESSMENT — PAIN DESCRIPTION - LOCATION
LOCATION: ANKLE;FOOT
LOCATION: FOOT

## 2023-06-10 ASSESSMENT — ENCOUNTER SYMPTOMS: RESPIRATORY NEGATIVE: 1

## 2023-06-10 NOTE — DISCHARGE INSTRUCTIONS
Come back if you get worse!! Follow up without fail. Take Motrin every 4 to 6 hours as needed for pain.

## 2023-06-10 NOTE — ED PROVIDER NOTES
DISPOSITION Decision To Discharge 06/09/2023 11:55:32 PM      PATIENT REFERRED TO:  Rajani Gaston, APRN - NP  [place] [address]  [phone]    Call in 3 days      Brodie Marti, [address] [facility] [address]  [address] [place] [address]  [phone]    Call in 3 days        DISCHARGE MEDICATIONS:  New Prescriptions    No medications on file     Controlled Substances Monitoring:     No flowsheet data found. (Please note that portions of this note were completed with a voice recognition program.  Efforts were made to edit the dictations but occasionally words are mis-transcribed. )    Nivia Quintero MD (electronically signed)  Attending Emergency Physician           Savi Subramanian MD  06/10/23 [id number]